# Patient Record
Sex: MALE | Race: WHITE
[De-identification: names, ages, dates, MRNs, and addresses within clinical notes are randomized per-mention and may not be internally consistent; named-entity substitution may affect disease eponyms.]

---

## 2018-07-20 ENCOUNTER — HOSPITAL ENCOUNTER (EMERGENCY)
Dept: HOSPITAL 97 - ER | Age: 28
Discharge: HOME | End: 2018-07-20
Payer: COMMERCIAL

## 2018-07-20 DIAGNOSIS — R07.9: Primary | ICD-10-CM

## 2018-07-20 LAB
BUN BLD-MCNC: 20 MG/DL (ref 7–18)
GLUCOSE SERPLBLD-MCNC: 89 MG/DL (ref 74–106)
HCT VFR BLD CALC: 45 % (ref 39.6–49)
LYMPHOCYTES # SPEC AUTO: 2.9 K/UL (ref 0.7–4.9)
MCH RBC QN AUTO: 28.8 PG (ref 27–35)
MCV RBC: 85.6 FL (ref 80–100)
PMV BLD: 10 FL (ref 7.6–11.3)
POTASSIUM SERPL-SCNC: 3.8 MMOL/L (ref 3.5–5.1)
RBC # BLD: 5.26 M/UL (ref 4.33–5.43)

## 2018-07-20 PROCEDURE — 85025 COMPLETE CBC W/AUTO DIFF WBC: CPT

## 2018-07-20 PROCEDURE — 96360 HYDRATION IV INFUSION INIT: CPT

## 2018-07-20 PROCEDURE — 84484 ASSAY OF TROPONIN QUANT: CPT

## 2018-07-20 PROCEDURE — 36415 COLL VENOUS BLD VENIPUNCTURE: CPT

## 2018-07-20 PROCEDURE — 99284 EMERGENCY DEPT VISIT MOD MDM: CPT

## 2018-07-20 PROCEDURE — 93005 ELECTROCARDIOGRAM TRACING: CPT

## 2018-07-20 PROCEDURE — 85379 FIBRIN DEGRADATION QUANT: CPT

## 2018-07-20 PROCEDURE — 71046 X-RAY EXAM CHEST 2 VIEWS: CPT

## 2018-07-20 PROCEDURE — 80048 BASIC METABOLIC PNL TOTAL CA: CPT

## 2018-07-20 NOTE — EDPHYS
Physician Documentation                                                                           

 [facility]                                                                

Name: Jin Fernández                                                                               

Age: 27 yrs                                                                                       

Sex: Male                                                                                         

: 1990                                                                                   

MRN: [id number]                                                                                   

Arrival Date: 2018                                                                          

Time: 11:04                                                                                       

Account#: [id number]                                                                            

Bed 7                                                                                             

Private MD:                                                                                       

ED Physician Salvador Lemus                                                                         

HPI:                                                                                              

                                                                                             

11:36 This 27 yrs old  Male presents to ER via EMS with complaints of chest pain.    rn  

11:36 The patient or guardian reports chest pain that is located primarily in the chest       rn  

      diffusely. The pain radiates to the left arm, Associated signs and symptoms: Pertinent      

      positives: None. Pertinent negatives: abdominal pain, cough, diaphoresis,                   

      lightheadedness, palpitations, shortness of breath, syncope, vomiting. The chest pain       

      is described as a pressure, squeezing. Duration: The patient or guardian reports            

      multiple episodes, that are intermittent. Severity of pain: At its worst the pain was       

      moderate in the emergency department the pain is unchanged. The patient has not             

      experienced similar symptoms in the past. Reports chest pain, left sided, radiates to       

      left neck and arm, intermittent, worse with movement/activity, works as  on          

      [employer], began yesterday, father had MI in 40s, uncle MI at 19, patient denies drugs.      

      No sob..                                                                                    

                                                                                                  

Historical:                                                                                       

- Allergies:                                                                                      

12:52 almonds;                                                                                tw2 

12:52 Tree Nuts;                                                                              tw2 

- PMHx:                                                                                           

12:52 Heart Murmur;                                                                           tw2 

- PSHx:                                                                                           

12:52 None;                                                                                   tw2 

                                                                                                  

- Immunization history:: Adult Immunizations up to date.                                          

- Family history:: not pertinent.                                                                 

- Social history:: Smoking status: Patient/guardian denies using tobacco.                         

- Ebola Screening: : Patient denies travel to an Ebola-affected area in the 21 days               

  before illness onset.                                                                           

- Hospitalizations: : No recent hospitalization is reported.                                      

                                                                                                  

                                                                                                  

ROS:                                                                                              

11:36 Constitutional: Negative for fever, chills, and weight loss, Eyes: Negative for injury, rn  

      pain, redness, and discharge, Neck: Negative for injury, pain, and swelling,                

      Cardiovascular: Negative for palpitations, and edema, Respiratory: Negative for             

      shortness of breath, cough, wheezing, and pleuritic chest pain, Abdomen/GI: Negative        

      for abdominal pain, nausea, vomiting, diarrhea, and constipation, MS/Extremity:             

      Negative for injury and deformity, Skin: Negative for injury, rash, and discoloration,      

      Neuro: Negative for headache, weakness, numbness, tingling, and seizure.                    

                                                                                                  

Exam:                                                                                             

11:36 Constitutional:  This is a well developed, well nourished patient who is awake, alert,  rn  

      and in no acute distress. Head/Face:  Normocephalic, atraumatic. Eyes:  Pupils equal        

      round and reactive to light, extra-ocular motions intact.  Lids and lashes normal.          

      Conjunctiva and sclera are non-icteric and not injected.  Cornea within normal limits.      

      Periorbital areas with no swelling, redness, or edema. Neck:  Trachea midline, no           

      thyromegaly or masses palpated, and no cervical lymphadenopathy.  Supple, full range of     

      motion without nuchal rigidity, or vertebral point tenderness.  No Meningismus.             

      Cardiovascular:  bradycardic, regular, no murmur Respiratory:  Lungs have equal breath      

      sounds bilaterally, clear to auscultation and percussion.  No rales, rhonchi or wheezes     

      noted.  No increased work of breathing, no retractions or nasal flaring. Abdomen/GI:        

      Soft, non-tender, with normal bowel sounds.  No distension or tympany.  No guarding or      

      rebound.  No evidence of tenderness throughout. MS/ Extremity:  Pulses equal, no            

      cyanosis.  Neurovascular intact.  Full, normal range of motion.  Equal circumference.       

      Neuro:  Awake and alert, GCS 15, oriented to person, place, time, and situation.            

      Cranial nerves II-XII grossly intact.  Motor strength 5/5 in all extremities.  Sensory      

      grossly intact.  Cerebellar exam normal.  Normal gait.                                      

                                                                                                  

Vital Signs:                                                                                      

11:08  / 76; Pulse 52; Resp 16 S; Temp 98.6(O); Pulse Ox 99% on R/A; Pain 7/10;         iw  

11:51  / 75; Pulse 56; Resp 14; Pulse Ox 99% on R/A;                                    tw2 

12:51  / 70; Pulse 53; Resp 14; Pulse Ox 100% on R/A;                                   tw2 

13:39  / 69; Pulse 50; Resp 17; Pulse Ox 99% on R/A;                                    tw2 

                                                                                                  

MDM:                                                                                              

11:05 Patient medically screened.                                                             rn  

13:07 Differential diagnosis: acute myocardial infarction, acute pericarditis, anxiety,       rn  

      coronary artery disease chest wall pain, costochondritis, esophagitis, mitral valve         

      prolapse, pericarditis, pleurisy, pneumothorax, pulmonary embolus. Data reviewed: vital     

      signs, nurses notes, lab test result(s), EKG, radiologic studies, plain films, and as a     

      result, I will discharge patient. Counseling: I had a detailed discussion with the          

      patient and/or guardian regarding: the historical points, exam findings, and any            

      diagnostic results supporting the discharge/admit diagnosis, lab results, radiology         

      results, the need for outpatient follow up, to return to the emergency department if        

      symptoms worsen or persist or if there are any questions or concerns that arise at          

      home. Special discussion: Based on the patient's history, exam, and Dx evaluation,          

      there is no indication for emergent intervention or inpatient Tx. It is understood by       

      the patient/guardian that if the Sx's persist or worsen they need to return immediately     

      for re-evaluation. I discussed with the patient/guardian in detail that at this point       

      there is no indication for admission to the hospital. It is understood, however, that       

      if the symptoms persist or worsen the patient needs to return immediately for               

      re-evaluation. ED course: Wife has called for appt, is going to be seen by work doctor,     

      and understands return precautions..                                                        

                                                                                                  

                                                                                             

11:06 Order name: CBC with Diff; Complete Time: 12:                                         rn  

                                                                                             

11:06 Order name: Basic Metabolic Panel; Complete Time: 12:                                 rn  

                                                                                             

11:06 Order name: XRAY Chest Pa And Lat (2 Views); Complete Time: 13:01                       rn  

                                                                                             

11:06 Order name: Troponin (emerg Dept Use Only); Complete Time: 12:08                        rn  

                                                                                             

11:06 Order name: D-Dimer; Complete Time: 12:08                                               rn  

                                                                                             

11:06 Order name: IV Start; Complete Time: 11:30                                              rn  

                                                                                             

11:06 Order name: EKG; Complete Time: 11:                                                   rn  

                                                                                             

11:06 Order name: EKG - Nurse/Tech; Complete Time: 11:30                                      rn  

                                                                                                  

Administered Medications:                                                                         

12:35 Drug: NS 0.9% 1000 ml Route: IV; Rate: 1000 ml; Site: right antecubital;                tw2 

13:35 Follow up: Response: No adverse reaction; IV Status: Completed infusion; IV Intake:     tw2 

      1000ml                                                                                      

                                                                                                  

                                                                                                  

Disposition:                                                                                      

18 13:15 Discharged to Home. Impression: Chest pain, unspecified.                           

- Condition is Stable.                                                                            

- Discharge Instructions: Nonspecific Chest Pain.                                                 

                                                                                                  

- Medication Reconciliation Form, Thank You Letter, Antibiotic Education, Prescription            

  Opioid Use, Work release form form.                                                             

- Follow up: Private Physician; When: As needed; Reason: Recheck today's complaints,              

  Re-evaluation by your physician.                                                                

- Problem is new.                                                                                 

- Symptoms have improved.                                                                         

                                                                                                  

                                                                                                  

                                                                                                  

Signatures:                                                                                       

Dispatcher [employer]                           EDMS                                                 

Salvador Lemus MD MD rn                                                   

AquinoTati RN                          RN   tw2                                                  

                                                                                                  

Corrections: (The following items were deleted from the chart)                                    

13:41 13:15 2018 13:15 Discharged to Home. Impression: Chest pain, unspecified.         tw2 

      Condition is Stable. Forms are Medication Reconciliation Form, Thank You Letter,            

      Antibiotic Education, Prescription Opioid Use. Follow up: Private Physician; When: As       

      needed; Reason: Recheck today's complaints, Re-evaluation by your physician. Problem is     

      new. Symptoms have improved. rn                                                             

                                                                                                  

**************************************************************************************************

## 2018-07-20 NOTE — ER
Nurse's Notes                                                                                     

 [facility]                                                                

Name: Jin Fernández                                                                               

Age: 27 yrs                                                                                       

Sex: Male                                                                                         

: 1990                                                                                   

MRN: [id number]                                                                                   

Arrival Date: 2018                                                                          

Time: 11:04                                                                                       

Account#: [id number]                                                                            

Bed 7                                                                                             

Private MD:                                                                                       

Diagnosis: Chest pain, unspecified                                                                

                                                                                                  

Presentation:                                                                                     

                                                                                             

11:00 Presenting complaint: EMS states: pt started having chest pain yesterday at 3 pm mid    tw2 

      chest, does radiate to left side and down arm, sinus arrythmia on ekg, BGL 84, vs           

      stable,324 asa and 1 spray 0.4 nitro administered. Transition of care: patient was not      

      received from another setting of care. Onset of symptoms was 2018. Risk            

      Assessment: Do you want to hurt yourself or someone else? Patient reports no desire to      

      harm self or others. Initial Sepsis Screen: Does the patient meet any 2 criteria? No.       

      Patient's initial sepsis screen is negative. Does the patient have a suspected source       

      of infection? No. Patient's initial sepsis screen is negative. Care prior to arrival:       

      Medication(s) given: ASA, 325 mg, x 1, Nitroglycerin, 0.4 mg SL x 1.                        

11:00 Method Of Arrival: EMS: [facility]                                                         tw2 

11:00 Acuity: [facility]                                                                           tw2 

                                                                                                  

Historical:                                                                                       

- Allergies:                                                                                      

12:52 almonds;                                                                                tw2 

12:52 Tree Nuts;                                                                              tw2 

- PMHx:                                                                                           

12:52 Heart Murmur;                                                                           tw2 

- PSHx:                                                                                           

12:52 None;                                                                                   tw2 

                                                                                                  

- Immunization history:: Adult Immunizations up to date.                                          

- Family history:: not pertinent.                                                                 

- Social history:: Smoking status: Patient/guardian denies using tobacco.                         

- Ebola Screening: : Patient denies travel to an Ebola-affected area in the 21 days               

  before illness onset.                                                                           

- Hospitalizations: : No recent hospitalization is reported.                                      

                                                                                                  

                                                                                                  

Screenin:30 Abuse screen: Denies threats or abuse. Nutritional screening: No deficits noted.        tw2 

      Tuberculosis screening: No symptoms or risk factors identified. Fall Risk None              

      identified.                                                                                 

                                                                                                  

Assessment:                                                                                       

11:10 General: Appears in no apparent distress. Behavior is calm, cooperative. Pain: Pain     hb  

      currently is 7 out of 10 on a pain scale. Neuro: Level of Consciousness is awake,           

      alert, obeys commands, Oriented to person, place, time, situation, Pupils are PERRLA.       

      Cardiovascular: Reports chest pain, Heart tones S1 S2 present Capillary refill < 3          

      seconds Patient's skin is warm and dry. Respiratory: Airway is patent Trachea midline       

      Respiratory effort is even, unlabored, Respiratory pattern is regular, symmetrical,         

      Breath sounds are clear bilaterally. GI: No signs and/or symptoms were reported             

      involving the gastrointestinal system. : No signs and/or symptoms were reported           

      regarding the genitourinary system. EENT: No signs and/or symptoms were reported            

      regarding the EENT system. Derm: No signs and/or symptoms reported regarding the            

      dermatologic system. Skin is pink, warm \T\ dry. Musculoskeletal: No signs and/or           

      symptoms reported regarding the musculoskeletal system.                                     

11:51 Reassessment: Patient appears in no apparent distress at this time. No changes from     tw2 

      previously documented assessment. Patient and/or family updated on plan of care and         

      expected duration. Pain level reassessed. Patient is alert, oriented x 3, equal             

      unlabored respirations, skin warm/dry/pink.                                                 

12:51 Reassessment: Patient appears in no apparent distress at this time. No changes from     tw2 

      previously documented assessment. Patient and/or family updated on plan of care and         

      expected duration. Pain level reassessed. Patient is alert, oriented x 3, equal             

      unlabored respirations, skin warm/dry/pink.                                                 

13:40 Reassessment: Patient appears in no apparent distress at this time. No changes from     tw2 

      previously documented assessment. Patient and/or family updated on plan of care and         

      expected duration. Pain level reassessed. Patient is alert, oriented x 3, equal             

      unlabored respirations, skin warm/dry/pink.                                                 

                                                                                                  

Vital Signs:                                                                                      

11:08  / 76; Pulse 52; Resp 16 S; Temp 98.6(O); Pulse Ox 99% on R/A; Pain 7/10;         iw  

11:51  / 75; Pulse 56; Resp 14; Pulse Ox 99% on R/A;                                    tw2 

12:51  / 70; Pulse 53; Resp 14; Pulse Ox 100% on R/A;                                   tw2 

13:39  / 69; Pulse 50; Resp 17; Pulse Ox 99% on R/A;                                    tw2 

                                                                                                  

ED Course:                                                                                        

11:04 Patient arrived in ED.                                                                  iw  

11:05 Salvador Lemus MD is Attending Physician.                                                rn  

11:08 Tati Aquino RN is Primary Nurse.                                                        tw2 

11:10 Triage completed.                                                                       tw2 

11:18 Cardiac monitor on. Pulse ox on. NIBP on.                                               tw2 

11:18 Arm band placed on.                                                                     tw2 

11:30 No provider procedures requiring assistance completed. Maintain EMS IV. Dressing        tw2 

      intact. Good blood return noted. Site clean \T\ dry. Gauge \T\ site: 20g RIGHT ac.          

11:31 X-ray completed. Patient tolerated procedure well. Patient moved to radiology via       sw  

      wheelchair. Patient moved back from radiology.                                              

11:31 Placed in gown. Bed in low position. Call light in reach.                               tw2 

11:33 XRAY Chest Pa And Lat (2 Views) In Process Unspecified.                                 EDMS

11:51 EKG done, by EKG tech. reviewed by Salvador Lemus MD.                                      tc  

13:41 IV discontinued, intact, bleeding controlled, No redness/swelling at site. Pressure     tw2 

      dressing applied.                                                                           

                                                                                                  

Administered Medications:                                                                         

12:35 Drug: NS 0.9% 1000 ml Route: IV; Rate: 1000 ml; Site: right antecubital;                tw2 

13:35 Follow up: Response: No adverse reaction; IV Status: Completed infusion; IV Intake:     tw2 

      1000ml                                                                                      

                                                                                                  

                                                                                                  

Intake:                                                                                           

13:35 IV: 1000ml; Total: 1000ml.                                                              tw2 

                                                                                                  

Outcome:                                                                                          

13:15 Discharge ordered by MD.                                                                rn  

13:40 Discharged to home ambulatory, with significant other.                                  tw2 

13:40 Condition: stable                                                                           

13:40 Discharge instructions given to patient, significant other, Instructed on discharge         

      instructions, follow up and referral plans. Demonstrated understanding of instructions,     

      follow-up care.                                                                             

13:41 Patient left the ED.                                                                    tw2 

                                                                                                  

Signatures:                                                                                       

Dispatcher [employer]                           Carri Cruz RN RN iw Nieto, Roman, MD MD rn Callis, Tiffany, EKG Tech               EKG Ttc                                                   

Loni Manley Heather, RN RN hb Wise, Tara, RN                          RN   tw2                                                  

                                                                                                  

Corrections: (The following items were deleted from the chart)                                    

11:40 11:08  / 76; Pulse 10bpm; Resp 52bpm; Pulse Ox 99% RA; Temp 98.6F Oral; Pain      iw  

      7/10; tw2                                                                                   

                                                                                                  

**************************************************************************************************

## 2018-07-20 NOTE — EKG
Test Date:    2018-07-20               Test Time:    11:06:44

Technician:   COLT                                    

                                                     

MEASUREMENT RESULTS:                                       

Intervals:                                           

Rate:         54                                     

TN:           150                                    

QRSD:         90                                     

QT:           398                                    

QTc:          377                                    

Axis:                                                

P:            9                                      

TN:           150                                    

QRS:          31                                     

T:            28                                     

                                                     

INTERPRETIVE STATEMENTS:                                       

                                                     

Sinus bradycardia

Otherwise normal ECG

Compared to ECG 06/19/2017 11:47:36

No significant changes



Electronically Signed On 07-20-18 18:47:22 CDT by Stanislaw Ward

## 2018-07-20 NOTE — RAD REPORT
EXAM DESCRIPTION:  RAD - Chest Pa And Lat (2 Views) - 7/20/2018 11:34 am

 

CLINICAL HISTORY:  Chest pain

 

COMPARISON:  June 2017

 

TECHNIQUE:  PA and lateral views of the chest were obtained.

 

FINDINGS:  The lungs are clear.   Heart size is normal and central vasculature is within normal limit
s.  No pleural effusion or pneumothorax seen.  No acute bony finding noted.  No aortic abnormality.

 

IMPRESSION:  No acute cardiopulmonary process.   No significant change from comparison.